# Patient Record
Sex: MALE | Race: WHITE
[De-identification: names, ages, dates, MRNs, and addresses within clinical notes are randomized per-mention and may not be internally consistent; named-entity substitution may affect disease eponyms.]

---

## 2017-07-04 PROBLEM — Z00.00 ENCOUNTER FOR PREVENTIVE HEALTH EXAMINATION: Status: ACTIVE | Noted: 2017-07-04

## 2017-07-14 ENCOUNTER — APPOINTMENT (OUTPATIENT)
Age: 37
End: 2017-07-14

## 2017-08-18 ENCOUNTER — APPOINTMENT (OUTPATIENT)
Dept: HUMAN REPRODUCTION | Facility: CLINIC | Age: 37
End: 2017-08-18
Payer: COMMERCIAL

## 2017-08-18 PROCEDURE — 89343 STORAGE/YEAR SPERM/SEMEN: CPT

## 2017-08-18 PROCEDURE — 89261 SPERM ISOLATION COMPLEX: CPT

## 2017-08-18 PROCEDURE — 89259 CRYOPRESERVATION SPERM: CPT

## 2017-08-18 PROCEDURE — 89322 SEMEN ANAL STRICT CRITERIA: CPT

## 2017-08-18 PROCEDURE — 89325 SPERM ANTIBODY TEST: CPT

## 2017-09-26 PROCEDURE — 89261 SPERM ISOLATION COMPLEX: CPT

## 2017-09-26 PROCEDURE — 89322 SEMEN ANAL STRICT CRITERIA: CPT

## 2018-03-30 ENCOUNTER — APPOINTMENT (OUTPATIENT)
Dept: HUMAN REPRODUCTION | Facility: CLINIC | Age: 38
End: 2018-03-30

## 2018-05-11 ENCOUNTER — APPOINTMENT (OUTPATIENT)
Dept: HUMAN REPRODUCTION | Facility: CLINIC | Age: 38
End: 2018-05-11
Payer: COMMERCIAL

## 2018-05-11 PROCEDURE — 89343 STORAGE/YEAR SPERM/SEMEN: CPT

## 2018-05-11 PROCEDURE — 89325 SPERM ANTIBODY TEST: CPT

## 2018-05-11 PROCEDURE — 89322 SEMEN ANAL STRICT CRITERIA: CPT

## 2018-05-11 PROCEDURE — 89259 CRYOPRESERVATION SPERM: CPT

## 2018-05-11 PROCEDURE — 89261 SPERM ISOLATION COMPLEX: CPT

## 2018-05-29 ENCOUNTER — TRANSCRIPTION ENCOUNTER (OUTPATIENT)
Age: 38
End: 2018-05-29

## 2018-05-30 ENCOUNTER — OTHER (OUTPATIENT)
Age: 38
End: 2018-05-30

## 2018-05-30 RX ORDER — DOXYCYCLINE HYCLATE 100 MG/1
100 CAPSULE ORAL TWICE DAILY
Qty: 20 | Refills: 0 | Status: ACTIVE | COMMUNITY
Start: 2018-05-30 | End: 1900-01-01

## 2018-06-12 ENCOUNTER — APPOINTMENT (OUTPATIENT)
Age: 38
End: 2018-06-12

## 2018-06-12 PROCEDURE — 89322 SEMEN ANAL STRICT CRITERIA: CPT

## 2018-06-12 PROCEDURE — 89261 SPERM ISOLATION COMPLEX: CPT

## 2020-04-28 ENCOUNTER — TRANSCRIPTION ENCOUNTER (OUTPATIENT)
Age: 40
End: 2020-04-28

## 2023-05-24 ENCOUNTER — APPOINTMENT (OUTPATIENT)
Dept: ORTHOPEDIC SURGERY | Facility: CLINIC | Age: 43
End: 2023-05-24
Payer: COMMERCIAL

## 2023-05-24 VITALS — BODY MASS INDEX: 21.48 KG/M2 | HEIGHT: 69 IN | WEIGHT: 145 LBS

## 2023-05-24 DIAGNOSIS — Z78.9 OTHER SPECIFIED HEALTH STATUS: ICD-10-CM

## 2023-05-24 PROCEDURE — 99204 OFFICE O/P NEW MOD 45 MIN: CPT

## 2023-05-24 RX ORDER — MELOXICAM 15 MG/1
15 TABLET ORAL DAILY
Qty: 30 | Refills: 1 | Status: ACTIVE | COMMUNITY
Start: 2023-05-24 | End: 1900-01-01

## 2023-05-28 PROBLEM — Z78.9 MODERATE DRINKER OF ALCOHOL: Status: ACTIVE | Noted: 2023-05-24

## 2023-05-28 PROBLEM — Z78.9 NON-SMOKER: Status: ACTIVE | Noted: 2023-05-24

## 2023-05-28 NOTE — DISCUSSION/SUMMARY
[de-identified] : 43 y/o M with left C6/7 HNP. \par MRI demonstrates Left sided HNP C6/7 with nerve impingement and mild spinal cord compression. \par Discussed possible interventional spine injections vs ACDF dependent upon response to conservative care. \par At this time, patient elects to initiate PT and NSAID therapy before consideration for WALKER. \par Patient was provided with a referral for cervical and upper extremity physical therapy to work on stretching, strengthening and range of motion. \par Script provided for Meloxicam. \par If left upper extremity strength continues to diminish, there would be more urgency for surgical intervention. \par F/U 3/4 WKS. \par \par Prior to appointment and during encounter with patient extensive medical records were reviewed including but not limited to, hospital records, outpatient records, imaging results, and lab data.During this appointment the patient was examined, diagnoses were discussed and explained in a face to face manner. In addition extensive time was spent reviewing aforementioned diagnostic studies. Counseling including abnormal image results, differential diagnoses, treatment options, risk and benefits, lifestyle changes, current condition, and current medications was performed. Patient's comments, questions, and concerns were addressed and patient verbalized understanding. Based on this patient's presentation at our office, which is an orthopedic spine surgeon's office, this patient inherently / intrinsically has a risk, however minute, of developing issues such as Cauda equina syndrome, bowel and bladder changes, or progression of motor or neurological deficits such as paralysis which may be permanent.\par \par TENNILLE MARRERO Acting as a Scribe for Dr. Musa\par I, Tennille Marrero, attest that this documentation has been prepared under the direction and in the presence of Provider Chong Musa MD.

## 2023-05-28 NOTE — PHYSICAL EXAM
[4___] : left triceps 4[unfilled]/5 [5___] : right grasp 5[unfilled]/5 [Normal Coordination] : normal coordination [Normal DTR UE/LE] : normal DTR UE/LE  [Normal Sensation] : normal sensation [Normal Mood and Affect] : normal mood and affect [Orientated] : orientated [Able to Communicate] : able to communicate [Normal Skin] : normal skin [No Rash] : no rash [No Ulcers] : no ulcers [No Lesions] : no lesions [No obvious lymphadenopathy in areas examined] : no obvious lymphadenopathy in areas examined [Well Developed] : well developed [Peripheral vascular exam is grossly normal] : peripheral vascular exam is grossly normal [No Respiratory Distress] : no respiratory distress [de-identified] : Constitutional:\par - General Appearance:\par Unremarkable\par Body Habitus\par Well Developed\par Well Nourished\par Body Habitus\par No Deformities\par Well Groomed\par Ability To communicate:\par Normal\par Neurologic:\par Global sensation is intact to upper and lower extremities. See examination of Neck and/or Spine\par for exceptions.\par Orientation to Time, Place and Person is: Normal\par Mood And Affect is Normal\par Skin:\par - Head/Face, Right Upper/Lower Extremity, Left Upper/Lower Extremity: Normal\par See Examination of Neck and/or Spine for exceptions\par Cardiovascular:\par Peripheral Cardiovascular System is Normal\par Palpation of Lymph Nodes:\par Normal Palpation of lymph nodes in: Axilla, Cervical, Inguinal\par Abnormal Palpation of lymph nodes in: None  [] : negative Rogers reflex

## 2023-05-28 NOTE — HISTORY OF PRESENT ILLNESS
[de-identified] : 5/24/23: 43 y/o M presenting for an initial evaluation of cervical spine. He c/o left sided neck pain and left upper extremity radic starting approx 2 weeks ago. No trauma. States onset of pain after traveling. Pain in the upper extremity most prominent in the left triceps. Tingling in the left index finger. He reports no change in UE strength. No right sided upper extremity symptoms. Outside orthopedic office prescribed MDP which reduced the severity of pains but he is not returned to his usual baseline. Also he was referred for MRI Scan. Also he was prescribed 2nd pain medication (unspecified), but it was unhelpful and he discontinued. No history of neck issues in the past. Patient is right handed. General medical health is good.  [FreeTextEntry5] : The patient is a 42 year old male who presents today complaining of neck/back pain\par Date of Injury/Onset: ~ 2 weeks\par Pain:    At Rest: 3/10 \par With Activity:  3/10 \par Mechanism of injury: Pt woke up with a sore neck, pain progressed to LFT arm,  armpit and elbow areas and radiated down to his \par fingers causing tingling and numbness.\par Quality of symptoms: Constant pain\par Improves with: Ciclobenzaprine/ Predinisone\par Worse with: Crossing the left arm to the RT side\par Prior treatment:  Pt saw an orthopedic doctor  on 5/08/23 who rx Methylpredinisone 4mg and Cyclobenzaprine 5 mg - pt reports some benefits from meds.\par Prior Imaging: MRI - @ [facility] on 5/9/23\par Additional Information:\par \par

## 2023-06-21 ENCOUNTER — APPOINTMENT (OUTPATIENT)
Dept: ORTHOPEDIC SURGERY | Facility: CLINIC | Age: 43
End: 2023-06-21
Payer: COMMERCIAL

## 2023-06-21 VITALS — BODY MASS INDEX: 21.48 KG/M2 | WEIGHT: 145 LBS | HEIGHT: 69 IN

## 2023-06-21 VITALS — BODY MASS INDEX: 21.48 KG/M2 | HEIGHT: 69 IN | WEIGHT: 145 LBS

## 2023-06-21 DIAGNOSIS — M54.12 RADICULOPATHY, CERVICAL REGION: ICD-10-CM

## 2023-06-21 PROCEDURE — 99214 OFFICE O/P EST MOD 30 MIN: CPT

## 2023-06-29 PROBLEM — M54.12 CERVICAL RADICULOPATHY AT C7: Status: ACTIVE | Noted: 2023-05-24

## 2023-06-29 NOTE — DISCUSSION/SUMMARY
[de-identified] : 41 y/o M with left C6/7 HNP. \par MRI demonstrates Left sided HNP C6/7 with nerve impingement and mild spinal cord compression. \par At this point, symptoms are controlled transient from current physical therapy trial and intermittent NSAID usage with Meloxicam. \par Possible interventional spine injections vs ACDF dependent upon  if there is a return of symptoms. \par If diminished left upper extremity strength does not return , there would be more urgency for surgical intervention. \par In the interim will continue with judicious use Meloxicam as prescribed. Finish PT and transition to home program.F/U 6 weeks. \par \par Prior to appointment and during encounter with patient extensive medical records were reviewed including but not limited to, hospital records, outpatient records, imaging results, and lab data.During this appointment the patient was examined, diagnoses were discussed and explained in a face to face manner. In addition extensive time was spent reviewing aforementioned diagnostic studies. Counseling including abnormal image results, differential diagnoses, treatment options, risk and benefits, lifestyle changes, current condition, and current medications was performed. Patient's comments, questions, and concerns were addressed and patient verbalized understanding. Based on this patient's presentation at our office, which is an orthopedic spine surgeon's office, this patient inherently / intrinsically has a risk, however minute, of developing issues such as Cauda equina syndrome, bowel and bladder changes, or progression of motor or neurological deficits such as paralysis which may be permanent.\par \par TENNILLE MARRERO Acting as a Scribe for Dr. Musa\par ALETHEA, Tennille Marrero, attest that this documentation has been prepared under the direction and in the presence of Provider Chong Musa MD.

## 2023-06-29 NOTE — HISTORY OF PRESENT ILLNESS
[de-identified] : 6/21/23: Patient presenting for a follow up of C Spine. Significant improvement since his last visit transient from current physical therapy. Chief complaint of centralized LT elbow pain, only present in the morning. Pain in the triceps resolved. No longer symptomatic enough to routinely take Meloxicam. UE strength is intact. \par \par 5/24/23: 41 y/o M presenting for an initial evaluation of cervical spine. He c/o left sided neck pain and left upper extremity radic starting approx 2 weeks ago. No trauma. States onset of pain after traveling. Pain in the upper extremity most prominent in the left triceps. Tingling in the left index finger. He reports no change in UE strength. No right sided upper extremity symptoms. Outside orthopedic office prescribed MDP which reduced the severity of pains but he is not returned to his usual baseline. Also he was referred for MRI Scan. Also he was prescribed 2nd pain medication (unspecified), but it was unhelpful and he discontinued. No history of neck issues in the past. Patient is right handed. General medical health is good.  [FreeTextEntry5] : The patient is a 42 year old male who presents today complaining of neck/back pain\par Date of Injury/Onset: ~ 2 weeks\par Pain:    At Rest: 3/10 \par With Activity:  3/10 \par Mechanism of injury: Pt woke up with a sore neck, pain progressed to LFT arm,  armpit and elbow areas and radiated down to his \par fingers causing tingling and numbness.\par Quality of symptoms: Constant pain\par Improves with: Ciclobenzaprine/ Predinisone\par Worse with: Crossing the left arm to the RT side\par Prior treatment:  Pt saw an orthopedic doctor  on 5/08/23 who rx Methylpredinisone 4mg and Cyclobenzaprine 5 mg - pt reports some benefits from meds.\par Prior Imaging: MRI - @ [facility] on 5/9/23\par Additional Information:\par \par

## 2023-06-29 NOTE — PHYSICAL EXAM
[Normal Coordination] : normal coordination [Normal DTR UE/LE] : normal DTR UE/LE  [Normal Sensation] : normal sensation [Normal Mood and Affect] : normal mood and affect [Orientated] : orientated [Able to Communicate] : able to communicate [Normal Skin] : normal skin [No Rash] : no rash [No Ulcers] : no ulcers [No Lesions] : no lesions [No obvious lymphadenopathy in areas examined] : no obvious lymphadenopathy in areas examined [Well Developed] : well developed [Peripheral vascular exam is grossly normal] : peripheral vascular exam is grossly normal [No Respiratory Distress] : no respiratory distress [NL (45)] : right lateral flexion 45 degrees [NL (80)] : right lateral rotation 80 degrees [5___] : right grasp 5[unfilled]/5 [Biceps 2+] : biceps 2+ [Triceps 2+] : triceps 2+ [Brachioradialis 2+] : brachioradialis 2+ [de-identified] : Constitutional:\par - General Appearance:\par Unremarkable\par Body Habitus\par Well Developed\par Well Nourished\par Body Habitus\par No Deformities\par Well Groomed\par Ability To communicate:\par Normal\par Neurologic:\par Global sensation is intact to upper and lower extremities. See examination of Neck and/or Spine\par for exceptions.\par Orientation to Time, Place and Person is: Normal\par Mood And Affect is Normal\par Skin:\par - Head/Face, Right Upper/Lower Extremity, Left Upper/Lower Extremity: Normal\par See Examination of Neck and/or Spine for exceptions\par Cardiovascular:\par Peripheral Cardiovascular System is Normal\par Palpation of Lymph Nodes:\par Normal Palpation of lymph nodes in: Axilla, Cervical, Inguinal\par Abnormal Palpation of lymph nodes in: None  [] : no rhomboid tenderness

## 2025-06-13 ENCOUNTER — NON-APPOINTMENT (OUTPATIENT)
Age: 45
End: 2025-06-13